# Patient Record
Sex: FEMALE | Race: WHITE
[De-identification: names, ages, dates, MRNs, and addresses within clinical notes are randomized per-mention and may not be internally consistent; named-entity substitution may affect disease eponyms.]

---

## 2018-10-22 ENCOUNTER — HOSPITAL ENCOUNTER (EMERGENCY)
Dept: HOSPITAL 25 - UCCORT | Age: 68
Discharge: HOME | End: 2018-10-22
Payer: MEDICARE

## 2018-10-22 VITALS — SYSTOLIC BLOOD PRESSURE: 129 MMHG | DIASTOLIC BLOOD PRESSURE: 73 MMHG

## 2018-10-22 DIAGNOSIS — I10: ICD-10-CM

## 2018-10-22 DIAGNOSIS — Z96.643: ICD-10-CM

## 2018-10-22 DIAGNOSIS — Z87.81: ICD-10-CM

## 2018-10-22 DIAGNOSIS — L23.1: ICD-10-CM

## 2018-10-22 DIAGNOSIS — Z79.1: ICD-10-CM

## 2018-10-22 DIAGNOSIS — M25.521: Primary | ICD-10-CM

## 2018-10-22 PROCEDURE — G0463 HOSPITAL OUTPT CLINIC VISIT: HCPCS

## 2018-10-22 PROCEDURE — 99202 OFFICE O/P NEW SF 15 MIN: CPT

## 2018-10-22 NOTE — RAD
Indication: Right arm injury.



4 views of the right elbow demonstrates degenerative changes with osteophyte formation of

the coronoid process as well as the radius. The lateral epicondyles demonstrate likely old

injury. No acute injury is noted.



IMPRESSION: Multiple areas of degenerative changes with osteophyte formation. Likely old

fracture of the lateral epicondyles.

## 2018-10-22 NOTE — UC
Upper Extremity HPI





- HPI Summary


HPI Summary: 


68 year old female presents with right elbow pain. States yesterday she slipped 

on wet grass, fell backward, and struck right elbow on metal bumper of horse 

trailer. Pain is constant ache over lateral aspect of elbow. Non-radiating. 

Worsens with movement. Improves with meloxicam. Denies numbness or tingling.








- History of Current Complaint


Chief Complaint: UCUpperExtremity


Stated Complaint: RIGHT ARM INJURY


Time Seen by Provider: 10/22/18 10:18


Hx Obtained From: Patient


Onset/Duration: Sudden Onset


Severity Currently: Moderate


Pain Intensity: 9


Location Of Pain: Is Discrete @ - right lateral elbow


Character: Aching


Aggravating Factor(s): Movement, Flexion, Extension


Alleviating Factor(s): OTC Meds, Rest


Associated Signs And Symptoms: Negative: Swelling, Bruising, Weakness, Numbness/

Tingling





- Allergies/Home Medications


Allergies/Adverse Reactions: 


 Allergies











Allergy/AdvReac Type Severity Reaction Status Date / Time


 


Adhesive Tape Allergy  Rash Verified 10/22/18 09:55











Home Medications: 


 Home Medications





Meloxicam [Mobic] 1 tab DAILY 10/22/18 [History Confirmed 10/22/18]











PMH/Surg Hx/FS Hx/Imm Hx


Cardiovascular History: Hypertension


Other History Of: 


   Negative For: Anticoagulant Therapy





- Surgical History


Surgical History: Yes


Surgery Procedure, Year, and Place: BILATERAL HIP REPLACEMENT/BUNION REMOVAL/

TUBIAL LIGATION





- Family History


Family History: Noncontributory





- Social History


Occupation: Retired


Lives: With Family


Alcohol Use: Rare


Substance Use Type: None


Smoking Status (MU): Never Smoked Tobacco





Review of Systems


Constitutional: Negative


Skin: Negative


Neurovascular: Negative


Musculoskeletal: Arthralgia - right elbow, Decreased ROM - right elbow


Is Patient Immunocompromised?: No


All Other Systems Reviewed And Are Negative: Yes





Physical Exam


Triage Information Reviewed: Yes


Appearance: Well-Appearing, Well-Nourished, Pain Distress - Mildly 

uncomfortable. Right arm supported with pillow.


Vital Signs: 


 Initial Vital Signs











Temp  98 F   10/22/18 09:57


 


Pulse  73   10/22/18 09:57


 


Resp  16   10/22/18 09:57


 


BP  129/73   10/22/18 09:57


 


Pulse Ox  97   10/22/18 09:57











Neck: Positive: Supple, Nontender


Respiratory: Positive: No respiratory distress


Cardiovascular: Positive: Pulses Normal, Brisk Capillary Refill


Musculoskeletal: Positive: Strength Intact -  strengths equal bilaterally, 

ROM Limited @ - Flexion and extension right elbow d/t pain, Edema @ - Mild 

edema left lateral elbow, Other: - Tenderness with palpation over lateral 

epicondyle. No crepitus or gross deformity noted.





Diagnostics





- Radiology


  ** No standard instances


Xray Interpretation: Positive (See Comments)


Radiology Interpretation Completed By: ED Physician - Mildly displaced fracture 

lateral epicondyle right humerus - question of old fracture, Radiologist - 

Patient Name: MARIAJOSE ADKINS Medical Record#: [id number] Ordering Physician

: Ke Nesbitt NP Acct.#: [id number] : 1950 Age: 68 Sex: F 

Location: URGENT [facility] - [place] Exam Date: 10/22/18 1025 ADM Status: REG ER  

Order Information: ELBOW RIGHT 3+ VWS Accession Number: [id number] CPT: 57598 

Indication: Right arm injury.  4 views of the right elbow demonstrates 

degenerative changes with osteophyte formation of the coronoid process as well 

as the radius. The lateral epicondyles demonstrate likely old injury. No acute 

injury is noted.  IMPRESSION: Multiple areas of degenerative changes with 

osteophyte formation. Likely old fracture of the lateral epicondyles.





Upper Extremity Course/Dx





- Course


Course Of Treatment: 68-year-old female presents with right elbow pain after 

slipping and falling yesterday and striking her elbow on the metal bumper of a 

horse trailer.  On exam she was tender over the lateral aspect of the elbow.  X-

ray shows a fracture of the lateral condyle was a question as to whether this 

may be an old fracture.  With the pain will treat as an acute fracture.  

Patient was placed in a sling.  Recommend RICE, over-the-counter analgesics, 

and range of motion exercises.  She was given a referral for follow-up with 

orthopedic surgery.  Warning symptoms reviewed.  Verbalizes understanding and 

agrees with plan of care.





- Differential Dx/Diagnosis


Differential Diagnosis/HQI/PQRI: Bursitis, Contusion, Fracture (Closed)


Provider Diagnoses: right elbow pain





Discharge





- Sign-Out/Discharge


Documenting (check all that apply): Patient Departure


All imaging exams completed and their final reports reviewed: Yes





- Discharge Plan


Condition: Stable


Disposition: HOME


Patient Education Materials:  Elbow Fracture (ED)


Referrals: 


Nona Zelaya NP [Primary Care Provider] - 


Roberto Kiran MD [Medical Doctor] - 5 Days (If no improvement. Call for 

appointment.)


Additional Instructions: 


The x-ray does performed in the clinic today showed a fracture however it 

appears to be old.  Because you are tender over that area I am going to treat 

it as an acute fracture.





Wear this ling that was provided to you for the next 2 days.  You may remove to 

shower and to sleep.  Be sure to take your arm out of the sling every couple of 

hours and do some gentle range of motion exercises.  After 2 days you do not 

need to use the sling but you should continue the range of motion exercises.





Rest the arm as much as possible.





Apply ice for 15-20 minutes over the affected area at least 4 times a day.





Be sure to keep her arm elevated at the level of heart to help reduce any 

swelling.





You may continue to use your meloxicam. You should not take any other anti-

inflammatories such as ibuprofen (Advil, Motrin) or naproxen (Aleve) as these 

are very similar to the meloxicam.  You may take acetaminophen (Tylenol) 

according to directions as needed for additional pain relief.





I have given you a referral to Dr. Kiran, orthopedic surgery, for follow-up if 

your symptoms persist.





Seek immediate medical attention if you have worsening pain that is not 

relieved with pain medications, have increased swelling, you develop numbness, 

tingling, or weakness of the arm, hands, or fingers, lose function of the arm.





- Billing Disposition and Condition


Condition: STABLE


Disposition: Home

## 2019-10-15 NOTE — HP
HISTORY AND PHYSICAL:

 

DATE OF ADMISSION/SURGERY:  10/24/19

 

DATE OF OFFICE VISIT:  10/14/19

 

SURGEON:  Rosalina Mejia MD.* (DICTATED BY CHERI NEW)

 

PROCEDURE:  Right total knee arthroplasty.

 

CHIEF COMPLAINT:  Right knee pain.

 

HISTORY OF PRESENT ILLNESS:  Ms. Snow is a 69-year-old female with 
continued complaints of right knee pain secondary to end-stage osteoarthritis.  
She has failed conservative treatment and elected to proceed with a right total 
knee arthroplasty.

 

PAST MEDICAL HISTORY:  Hypertension, high cholesterol, mitral valve 
regurgitation, GERD, basal cell carcinoma, and history of Lyme disease.

 

PAST SURGICAL HISTORY:  Bunionectomy and bilateral total hip arthroplasties.

 

CURRENT MEDICATIONS:

1.  Amlodipine 5 mg a day.

2.  Losartan potassium 50 mg a day.

3.  Meloxicam 15 mg daily.

4.  Ranitidine 150 mg twice a day.

5.  Magnesium 300 mg a day.

6.  Vitamin C.

7.  Vitamin D.

8.  Vitamin E.

9.  Probiotic.

10.  Metamucil as needed.

 

ALLERGIES:  No known drug allergies.

 

FAMILY HISTORY:  Coronary artery disease.

 

SOCIAL HISTORY:  She is a 69-year-old female, lives with her .  She does 
not smoke, use drugs or alcohol.

 

REVIEW OF SYSTEMS:  A complete 14-point review of systems was reviewed with the 
patient.  It was positive for GERD.  She denies history of DVT, PE, hepatitis, 
HIV, or anesthesia problems.

 

                               PHYSICAL EXAMINATION

 

GENERAL:  She is well developed, well nourished, in no acute distress.

 

VITAL SIGNS:  She stands 67 inches tall, weighs 248 pounds.  Blood pressure is 
140/82, heart rate is 76.

 

HEENT:  Normocephalic, atraumatic.

 

NECK:  Supple.  No palpable lymph nodes.

 

PULMONARY:  The lungs are clear to auscultation bilaterally.

 

CARDIO:  Regular rate and rhythm.  Strong S1, S2.

 

ABDOMEN:  Soft, nontender, nondistended.

 

NEUROLOGICAL:  She is alert and oriented x3.

 

MUSCULOSKELETAL:  Right lower extremity:  The skin is intact.  There are no 
open wounds or abrasions.  There is a moderate effusion of the right knee 
joint.  Range of motion is 10 to 100 degrees of flexion.  She has a 2+ dorsalis 
pedis pulse and intact sensation.

 

 ASSESSMENT AND PLAN:  Ms. Snow is a 69-year-old female with end-stage 
osteoarthritis of the right knee.  She has failed conservative treatment and 
elected to proceed with a right total knee arthroplasty.  The surgery is 
scheduled for 10/24/19 with Dr. Mejia.  Dr. Mejia discussed the risks and 
benefits of the surgery at today's visit and all of her questions were 
answered.  She will follow up with Dr. Mejia 2 weeks after the surgery.

 

 ____________________________________ CHERI NEW

 

[id number]/CPS #: [id number]

[email or web address]

## 2019-10-24 ENCOUNTER — HOSPITAL ENCOUNTER (INPATIENT)
Dept: HOSPITAL 25 - AA | Age: 69
LOS: 1 days | Discharge: HOME HEALTH | DRG: 470 | End: 2019-10-25
Attending: ORTHOPAEDIC SURGERY | Admitting: ORTHOPAEDIC SURGERY
Payer: MEDICARE

## 2019-10-24 DIAGNOSIS — I34.0: ICD-10-CM

## 2019-10-24 DIAGNOSIS — E78.00: ICD-10-CM

## 2019-10-24 DIAGNOSIS — M25.461: ICD-10-CM

## 2019-10-24 DIAGNOSIS — I83.90: ICD-10-CM

## 2019-10-24 DIAGNOSIS — K44.9: ICD-10-CM

## 2019-10-24 DIAGNOSIS — L25.9: ICD-10-CM

## 2019-10-24 DIAGNOSIS — Z85.828: ICD-10-CM

## 2019-10-24 DIAGNOSIS — M17.0: Primary | ICD-10-CM

## 2019-10-24 DIAGNOSIS — L57.0: ICD-10-CM

## 2019-10-24 DIAGNOSIS — M25.761: ICD-10-CM

## 2019-10-24 DIAGNOSIS — K21.9: ICD-10-CM

## 2019-10-24 DIAGNOSIS — E78.2: ICD-10-CM

## 2019-10-24 DIAGNOSIS — Z88.8: ICD-10-CM

## 2019-10-24 DIAGNOSIS — Z96.643: ICD-10-CM

## 2019-10-24 DIAGNOSIS — E66.9: ICD-10-CM

## 2019-10-24 DIAGNOSIS — I10: ICD-10-CM

## 2019-10-24 DIAGNOSIS — I42.9: ICD-10-CM

## 2019-10-24 PROCEDURE — 80048 BASIC METABOLIC PNL TOTAL CA: CPT

## 2019-10-24 PROCEDURE — 36415 COLL VENOUS BLD VENIPUNCTURE: CPT

## 2019-10-24 PROCEDURE — 88311 DECALCIFY TISSUE: CPT

## 2019-10-24 PROCEDURE — 85049 AUTOMATED PLATELET COUNT: CPT

## 2019-10-24 PROCEDURE — 0SRC0J9 REPLACEMENT OF RIGHT KNEE JOINT WITH SYNTHETIC SUBSTITUTE, CEMENTED, OPEN APPROACH: ICD-10-PCS | Performed by: ORTHOPAEDIC SURGERY

## 2019-10-24 PROCEDURE — C1776 JOINT DEVICE (IMPLANTABLE): HCPCS

## 2019-10-24 PROCEDURE — 88305 TISSUE EXAM BY PATHOLOGIST: CPT

## 2019-10-24 PROCEDURE — 85018 HEMOGLOBIN: CPT

## 2019-10-24 PROCEDURE — 85014 HEMATOCRIT: CPT

## 2019-10-24 RX ADMIN — FAMOTIDINE SCH MG: 20 TABLET, FILM COATED ORAL at 21:31

## 2019-10-24 RX ADMIN — ACETAMINOPHEN SCH MG: 325 TABLET ORAL at 21:32

## 2019-10-24 RX ADMIN — FENTANYL CITRATE PRN MCG: 0.05 INJECTION, SOLUTION INTRAMUSCULAR; INTRAVENOUS at 18:18

## 2019-10-24 RX ADMIN — MAGNESIUM HYDROXIDE SCH ML: 400 SUSPENSION ORAL at 21:31

## 2019-10-24 RX ADMIN — CEFAZOLIN SCH MLS/HR: 1 INJECTION, POWDER, FOR SOLUTION INTRAVENOUS at 23:48

## 2019-10-24 RX ADMIN — SODIUM CHLORIDE, SODIUM LACTATE, POTASSIUM CHLORIDE, AND CALCIUM CHLORIDE SCH MLS/HR: 600; 310; 30; 20 INJECTION, SOLUTION INTRAVENOUS at 20:12

## 2019-10-24 RX ADMIN — FENTANYL CITRATE PRN MCG: 0.05 INJECTION, SOLUTION INTRAMUSCULAR; INTRAVENOUS at 18:41

## 2019-10-24 RX ADMIN — DOCUSATE SODIUM SCH MG: 100 CAPSULE, LIQUID FILLED ORAL at 21:32

## 2019-10-24 NOTE — XMS REPORT
Continuity of Care Document (CCD)

 Created on:2019



Patient:Cindy Snow

Sex:Female

:1950

External Reference #:[id number]





Demographics







 Address  [address] [place] [address]

 

 Home Phone  [phone]

 

 Mobile Phone  [phone]

 

 Preferred Language  en

 

 Marital Status  Declined to Specify/Unknown

 

 [employer] Affiliation  Unknown

 

 Race  White

 

 Ethnic Group  Declined to Specify/Unknown









Author







 Name  Cyndee Theodore MD, PHD

 

 Address  [address] [place] [address],  [address] [place] [id number]









Care Team Providers







 Name  Role  Phone

 

 Cyndee Theodore MD, PHD - Family  Care Team Information   [phone]
[phone]



 Medicine    









Problems







 Active Problems  Provider  Date

 

 Localized, primary osteoarthritis of the  Olegario Ribera MD  Onset: 2012



 pelvic region and thigh    

 

 Prosthetic arthroplasty of the hip  Jackie Bernard MD  Onset: 2012

 

 Chest pain  Ayala Fischer ANP  Onset: 10/14/2015

 

 Mixed hyperlipidemia  Ayala Fischer ANP  Onset: 10/14/2015

 

 Essential hypertension  Ayala Fischer ANP  Onset: 10/14/2015

 

 Contact dermatitis  Cyndee Theodore MD, PHD  Onset: 2019

 

 Actinic keratosis  Cyndee Theodore MD, PHD  Onset: 2019

 

 Insomnia  Cnydee Theodore MD, PHD  Onset: 2019

 

 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

 

 Localized, primary osteoarthritis  Cyndee Theodore MD, PHD  Onset: 2019

 

 Diaphragmatic hernia  Cyndee Theodore MD, PHD  Onset: 2019

 

 Cardiomyopathy, unspecified  Cyndee Theodore MD, PHD  Onset: 10/15/2019

 

 Mitral valve disorder  Cyndee Theodore MD, PHD  Onset: 10/15/2019

 

 Pain in limb  Cyndee Theodore MD, PHD  Onset: 04/15/2019

 

 Gynecologic examination  Cyndee Theodore MD, PHD  Onset: 04/15/2019







Social History







 Type  Date  Description  Comments

 

 Birth Sex    Unknown  

 

 Tobacco Use  Start: Unknown  Never Smoked Cigarettes  

 

 ETOH Use    Rarely consumes alcohol  

 

 Tobacco Use  Start: Unknown  Patient denies history of smoking  

 

 Smoking Status  Reviewed: 10/10/19  Patient denies history of smoking  







Allergies, Adverse Reactions, Alerts







 Active Allergies  Reaction  Severity  Comments  Date

 

 NKDA        2018

 

 Environmental        2013









 Inactive Allergies









 NKDA        2012

 

 Adhesives      Sores  10/03/2012







Medications







 Active Medications  SIG  Qnty  Indications  Ordering  Date



         Provider  

 

 D3 Maximum Strength  1 cap by mouth  90caps  M17.0  Cyndee Theodore,  2019



   every day      MD, PHD  



 5000Unit Capsules          



           

 

 Arnicare  apply to affected  75g  M17.0  Cyndee Theodore,  2019



           Gel  area four times a      MD, PHD  



   day pain, swelling,        



   bruising        

 

 Ranitidine HCL  take one tablet by  180tabs  K44.9  Cyndee Theodore,  2019



   mouth twice a day      MD, PHD  



 150mg Tablets  as needed        



           

 

 Nystatin  apply to affected  90gm  L30.9  Cyndee Theodore,  2019



   area twice a day      MD, PHD  



 [id number] Cream  until rash has        



   disappeared for a        



   few days        

 

 Losartan Potassium  1 by mouth every  90tabs  I10  Vivien Singh  2018



   day      DELIA Sanders,  



 50mg Tablets        FNP  



           

 

 Amlodipine Besylate  1 by mouth every  30tabs  I10  Vivien Singh  2018



   day      DELIA Sanders,  



 5mg Tablets        FNP  



           

 

 Herbal Supplements        Vivien Singh  03/08/2018



 X 8        DELIA Sanders,  



         FNP  

 

 Meloxicam  1 by mouth every  90tabs  M16.0  Sarah Eduardo,  



           15mg  day      FNP  



 Tablets          



           

 

 Magnesium  take 1 tab by mouth      Unknown  



           300mg  every daily        



 Capsules          



           

 

 Aspirin Adult  1 tab by mouth      Unknown  



               325mg  every day        



 Tablets          



           







Immunizations







 CPT Code  Status  Date  Vaccine  Lot #

 

 [id number]  Given  2019  Tdap injection  







Vital Signs







 Date  Vital  Result  Comment

 

 10/15/2019  2:00pm  BP Systolic  132 mmHg  









 BP Diastolic  80 mmHg  

 

 Body Temperature  97.4 F  

 

 Heart Rate  82 /min  

 

 Respiratory Rate  16 /min  

 

 Height  67 inches  5'7"

 

 Weight  247.00 lb  

 

 BMI (Body Mass Index)  38.7 kg/m2  

 

 BSA (Body Surface Area)  2.21 m2  

 

 Ideal body weight in kilograms  61 kg  

 

 O2 % BldC Oximetry  97 %  









 10/04/2019  7:49am  BP Systolic Sitting Left Arm  126 mmHg  









 BP Diastolic Sitting Left Arm  72 mmHg  

 

 Heart Rate  71 /min  

 

 Respiratory Rate  18 /min  

 

 Height  67 inches  5'7"

 

 Weight  248.00 lb  

 

 BMI (Body Mass Index)  38.8 kg/m2  

 

 BSA (Body Surface Area)  2.22 m2  

 

 Ideal body weight in kilograms  61 kg  

 

 O2 % BldC Oximetry  97 %  







Results







 Description

 

 No Information Available







Procedures







 Date  Code  Description  Status

 

 10/04/2019  24495  EKG-Tracing And Report  Completed







Medical Devices







 Description

 

 No Information Available







Encounters







 Type  Date  Location  Provider  Dx  Diagnosis

 

 Office Visit  10/15/2019  Family Medicine  Arben  Z01.810  Encounter for



   2:15p  [facility]  MD Cyndee,    preprocedural



       PHD    cardiovascular



           examination









 M17.0  Bilateral primary osteoarthritis of knee

 

 N95.1  Menopausal and female climacteric states

 

 I10  Essential (primary) hypertension

 

 I34.0  Nonrheumatic mitral (valve) insufficiency

 

 I42.9  Cardiomyopathy, unspecified









 Office Visit  10/04/2019  Cardiology  PAMELLA Baez01.810  Encounter for



   8:20a  Office  Kasia GUZMAN,    preprocedural



       PA    cardiovascular



           examination









 I42.9  Cardiomyopathy, unspecified

 

 I34.0  Nonrheumatic mitral (valve) insufficiency

 

 I10  Essential (primary) hypertension

 

 E78.2  Mixed hyperlipidemia









 Office Visit  2019 10:40a  Cardiology  Vivien Singh  R07.9  Chest pain,



     Office  DELIA Sanders,    unspecified



       FNP    









 I42.9  Cardiomyopathy, unspecified

 

 I34.0  Nonrheumatic mitral (valve) insufficiency

 

 I10  Essential (primary) hypertension

 

 E78.2  Mixed hyperlipidemia







Assessments







 Date  Code  Description  Provider

 

 10/15/2019  Z01.810  Encounter for preprocedural  Cyndee Theodore MD, PHD



     cardiovascular examination  

 

 10/15/2019  M17.0  Bilateral primary osteoarthritis of  Cyndee Theodore MD, 
PHD



     knee  

 

 10/15/2019  N95.1  Menopausal and female climacteric  Cyndee Theodore MD, PHD



     states  

 

 10/15/2019  I10  Essential (primary) hypertension  Cyndee Theodore MD, PHD

 

 10/15/2019  I34.0  Nonrheumatic mitral (valve)  Cyndee Theodore MD, PHD



     insufficiency  

 

 10/15/2019  I42.9  Cardiomyopathy, unspecified  Cyndee Theodore MD, PHD

 

 10/04/2019  Z01.810  Encounter for preprocedural  Kasia Baez, PA



     cardiovascular examination  

 

 10/04/2019  I42.9  Cardiomyopathy, unspecified  Kasia Baez, PA

 

 10/04/2019  I34.0  Nonrheumatic mitral (valve)  Kasia Baez, PA



     insufficiency  

 

 10/04/2019  I10  Essential (primary) hypertension  Kasia Baez, PA

 

 10/04/2019  E78.2  Mixed hyperlipidemia  Kasia Baez, PA

 

 2019  R07.9  Chest pain, unspecified  Vivien Singh, MSN,



       FNP

 

 2019  I42.9  Cardiomyopathy, unspecified  Vivien Singh, MSN,



       FNP

 

 2019  I34.0  Nonrheumatic mitral (valve)  Vivien Singh, DELIA,



     insufficiency  FNP

 

 2019  I10  Essential (primary) hypertension  Vivien Singh, MSN,



       FNP

 

 2019  E78.2  Mixed hyperlipidemia  Vivien Singh, MSN,



       FNP







Plan of Treatment

Future Appointment(s):2020 11:00 am - Vivien Singh, DELIA, FNP at 
[facility]16/15/2019 - Cyndee Theodore MD, PHDZ01.810 Encounter for 
preprocedural cardiovascular examinationNew Labs:Ua RFX Micro &amp; Culture II, 
Ordered: 10/15/19CBC W/Auto Diff &amp; PLT, Ordered: 10/15/19CMP &amp; CBC, 
Ordered: 10/15/19PT W/Inr, Ordered: 10/15/19Comments:LABS received   - done [place] [facility] - all normalRebecca is optimized for her knee surgery.Follow up:
need records release from Dr. Mejia for lab work drawn on 10/14/19 so we can 
clear her for qakfospL97.0 Bilateral primary osteoarthritis of kneeN95.1 
Menopausal and female climacteric [id number] Essential (primary) 
xhpjlcqdiwgxT16.0 Nonrheumatic mitral (valve) hryorvafnmavtJ27.9 Cardiomyopathy
, unspecified



Functional Status







 Functional Condition  Comment  Date  Status

 

 Independent with all ADL's      Active







Mental Status







 Description

 

 No Information Available







Referrals







 Description

 

 No Information Available

## 2019-10-24 NOTE — XMS REPORT
Continuity of Care Document (CCD)

 Created on:2019



Patient:Cindy Snow

Sex:Female

:1950

External Reference #:[id number]





Demographics







 Address  [address] [place] [address]

 

 Home Phone  [phone]

 

 Mobile Phone  [phone]

 

 Preferred Language  en

 

 Marital Status  Declined to Specify/Unknown

 

 Yarsanism Affiliation  Unknown

 

 Race  White

 

 Ethnic Group  Declined to Specify/Unknown









Author







 Name  Kasia Baez PA (transmitted by agent of provider Maura Cagle)

 

 Address  [address], [address] [place] [address]









Care Team Providers







 Name  Role  Phone

 

 Cyndee Theodore MD, PHD - Family  Care Team Information   [phone]
[phone]



 Medicine    









Problems







 Active Problems  Provider  Date

 

 Localized, primary osteoarthritis of the  BacilioOlegario MD  Onset: 2012



 pelvic region and thigh    

 

 Prosthetic arthroplasty of the hip  Jackie Bernard MD  Onset: 2012

 

 Chest pain  Ayala Fischer ANP  Onset: 10/14/2015

 

 Mixed hyperlipidemia  Ayala Fischer ANP  Onset: 10/14/2015

 

 Essential hypertension  Ayala Fischer ANP  Onset: 10/14/2015

 

 Contact dermatitis  Cyndee Theodore MD, PHD  Onset: 2019

 

 Actinic keratosis  Cyndee Theodore MD, PHD  Onset: 2019

 

 Insomnia  Cyndee Theodore MD, PHD  Onset: 2019

 

 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

 

 Localized, primary osteoarthritis  Cyndee Thedoore MD, PHD  Onset: 2019

 

 Diaphragmatic hernia  Cyndee Theodore MD, PHD  Onset: 2019

 

 Pain in limb  Cyndee Theodore MD, PHD  Onset: 04/15/2019

 

 Gynecologic examination  Cyndee Theodore MD, PHD  Onset: 04/15/2019







Social History







 Type  Date  Description  Comments

 

 Birth Sex    Unknown  

 

 Tobacco Use  Start: Unknown  Never Smoked Cigarettes  

 

 ETOH Use    Rarely consumes alcohol  

 

 Tobacco Use  Start: Unknown  Patient denies history of smoking  

 

 Smoking Status  Reviewed: 04/15/19  Patient denies history of smoking  







Allergies, Adverse Reactions, Alerts







 Active Allergies  Reaction  Severity  Comments  Date

 

 NKDA        2018

 

 Environmental        2013









 Inactive Allergies









 NKDA        2012

 

 Adhesives      Sores  10/03/2012







Medications







 Active Medications  SIG  Qnty  Indications  Ordering  Date



         Provider  

 

 D3 Maximum Strength  1 cap by mouth  90caps  M17.0  Cnydee Theodore,  2019



   every day      MD, PHD  



 5000Unit Capsules          



           

 

 Arnicare  apply to affected  75g  M17.0  Cyndee Theodore,  2019



           Gel  area four times a      MD, PHD  



   day pain, swelling,        



   bruising        

 

 Ranitidine HCL  take one tablet by  180tabs  K44.9  Cyndee Theodore,  2019



   mouth twice a day      MD, PHD  



 150mg Tablets  as needed        



           

 

 Nystatin  apply to affected  90gm  L30.9  Cyndee Theodore,  2019



   area twice a day      MD, PHD  



 296456Bfgd/GM Cream  until rash has        



   disappeared for a        



   few days        

 

 Losartan Potassium  1 by mouth every  90tabs  I10  Vivien Singh  2018



   day      DELIA Sanders,  



 50mg Tablets        FNP  



           

 

 Amlodipine Besylate  1 by mouth every  30tabs  I10  Vivien Singh  2018



   day      DELIA Sanders,  



 5mg Tablets        FNP  



           

 

 Herbal Supplements        Vivien Singh  03/08/2018



 X 8        DELIA Sanders,  



         FNP  

 

 Meloxicam  1 by mouth every      Unknown  



           15mg  day        



 Tablets          



           

 

 Magnesium  take 1 tab by mouth      Unknown  



           300mg  every daily        



 Capsules          



           

 

 Aspirin Adult  1 tab by mouth      Unknown  



               325mg  every day        



 Tablets          



           









 History Medications









 Gabapentin  1-2 tab by mouth  60caps  N95.1  Cyndee Theodore,  04/15/2019 -



           300mg  at bedtime      MD, PHD  Unknown



 Capsules          



           







Immunizations







 CPT Code  Status  Date  Vaccine  Lot #

 

 [id number]  Given  2019  Tdap injection  







Vital Signs







 Date  Vital  Result  Comment

 

 10/04/2019  7:49am  BP Systolic Sitting Left Arm  126 mmHg  









 BP Diastolic Sitting Left Arm  72 mmHg  

 

 Heart Rate  71 /min  

 

 Respiratory Rate  18 /min  

 

 Height  67 inches  5'7"

 

 Weight  248.00 lb  

 

 BMI (Body Mass Index)  38.8 kg/m2  

 

 BSA (Body Surface Area)  2.22 m2  

 

 Ideal body weight in kilograms  61 kg  

 

 O2 % BldC Oximetry  97 %  









 2019 10:41am  BP Systolic Sitting Left Arm  138 mmHg  









 BP Diastolic Sitting Left Arm  90 mmHg  

 

 Heart Rate  76 /min  

 

 Respiratory Rate  18 /min  

 

 Height  67 inches  5'7"

 

 Weight  249.00 lb  

 

 BMI (Body Mass Index)  39.0 kg/m2  

 

 BSA (Body Surface Area)  2.22 m2  

 

 Ideal body weight in kilograms  61 kg  

 

 O2 Saturation Level with Exercise  93 %  







Results







 Test  Date  Facility  Test  Result  H/L  Range  Note

 

 Genital Culture  04/15/2019  [facility]  Gram Stain  GRAM STAIN      1, 2



 W/ Gram Stain    [address]    INDIC <SEE      



     [place] [address]    NOTE>      



     [phone]          









 Gram Stain  FEW GR POS. BACI <SEE NOTE>      3

 

 Gram Stain  RARE GRAM POSITI <SEE NOTE>      4

 

 Gram Stain  FEW WHITE BLOOD  <SEE NOTE>      5

 

 Genital Culture  GENITAL TELAM      









 Laboratory test  04/15/2019  [facility]  Uric Acid  4.2 mg/dL  Normal  2.6-6.0  



 finding    [address]          



     [place] [address]          



     [phone]          









 Anti-Nuclear Antibodies Direct  Negative AU/mL    Negative  6

 

 Rheumatoid Factor Screen  < 10.0 IU/mL  Normal  0.0-15.0  









 CCP Igg/Iga  04/15/2019  [facility]  CCP Igg/Iga  5 units    0-19  7



 Antibodies    [address]  Antibodies        



     [place] [address]          



     [phone]          

 

 HPV High Risk  04/15/2019  [facility]  HPV, high-risk  NEGATIVE    Negative  



     [address]          



     [place] [address]          



     [phone]          









 HPV Source:  THIN PREP      









 1  Z01.419,M79.674

 

 2  GRAM STAIN INDICATES NORMAL GENITAL TELMA

 

 3  FEW GR POS. BACILLI SUGGESTIVE OF LACTOBACILLUS SP.

 

 4  RARE GRAM POSITIVE COCCI

 

 5  FEW WHITE BLOOD CELLS

 

 6  Performed at:   - [facility]  [id number]



   : Gilbert Diaz MD, Phone:  [phone]



   Performed at:   - [facility] [address]  [id number]



   : Araceli B Reyes MD, Phone:  [phone]

 

 7  Negative               <20



   Weak positive      20 - 39



   Moderate positive  40 - 59



   Strong positive        >59







Procedures







 Date  Code  Description  Status

 

 10/04/2019  72222  EKG-Tracing And Report  Completed







[employer]







 Description

 

 No Information Available







Encounters







 Type  Date  Location  Provider  Dx  Diagnosis

 

 Office Visit  10/04/2019  Cardiology Office  Kasia Baez  Z01.810  
Encounter for



   8:20a    CHERI GUZMAN    preprocedural



           cardiovascular



           examination









 I42.9  Cardiomyopathy, unspecified

 

 I34.0  Nonrheumatic mitral (valve) insufficiency

 

 I10  Essential (primary) hypertension

 

 E78.2  Mixed hyperlipidemia









 Office Visit  2019 10:40a  Cardiology  Vivien Singh  R07.9  Chest pain,



     Office  DELIA Sanders,    unspecified



       FNP    









 I42.9  Cardiomyopathy, unspecified

 

 I34.0  Nonrheumatic mitral (valve) insufficiency

 

 I10  Essential (primary) hypertension

 

 E78.2  Mixed hyperlipidemia









 Office Visit  04/15/2019 11:30a  Family Medicine  Arben,  Z01.419  Encntr for 
gyn



     [facility]  MD Cyndee,    exam (general)



       PHD    (routine) w/o



           abn findings









 Z01.419  Encntr for gyn exam (general) (routine) w/o abn findings

 

 N95.1  Menopausal and female climacteric states

 

 N95.1  Menopausal and female climacteric states

 

 M79.674  Pain in right toe(s)

 

 I10  Essential (primary) hypertension

 

 M79.674  Pain in right toe(s)







Assessments







 Date  Code  Description  Provider

 

 10/04/2019  Z01.810  Encounter for preprocedural  Kasia Baez, PA



     cardiovascular examination  

 

 10/04/2019  I42.9  Cardiomyopathy, unspecified  Kasia Baez, PA

 

 10/04/2019  I34.0  Nonrheumatic mitral (valve)  Kasia Baez, PA



     insufficiency  

 

 10/04/2019  I10  Essential (primary) hypertension  Kasia Baez, PA

 

 10/04/2019  E78.2  Mixed hyperlipidemia  Kasia Baez, PA

 

 2019  R07.9  Chest pain, unspecified  Vivien Singh, MSN,



       FNP

 

 2019  I42.9  Cardiomyopathy, unspecified  Vivien Singh, MSN,



       FNP

 

 2019  I34.0  Nonrheumatic mitral (valve)  SinghVivien beltran, MSN,



     insufficiency  FNP

 

 2019  I10  Essential (primary) hypertension  Vivien Singh, MSN,



       FNP

 

 2019  E78.2  Mixed hyperlipidemia  Vivien Singh, MSN,



       FNP

 

 04/15/2019  Z01.419  Encounter for gynecological  Cyndee Theodore MD, PHD



     examination (general) (routine)  



     without abnormal findings  

 

 04/15/2019  Z01.419  Encounter for gynecological  Cyndee Theodore MD, PHD



     examination (general) (routine)  

 

 04/15/2019  N95.1  Menopausal and female climacteric  Cyndee Theodore MD, PHD



     states  

 

 04/15/2019  N95.1  Menopausal and female climacteric  Cyndee Theodore MD, PHD



     states  

 

 04/15/2019  M79.674  Pain in right toe(s)  Cyndee Theodore MD, PHD

 

 04/15/2019  I10  Essential (primary) hypertension  Cyndee Theodore MD, PHD

 

 04/15/2019  M79.674  Pain in right toe(s)  Cyndee Theodore MD, PHD







Plan of Treatment

Future Appointment(s):10/15/2019  2:15 pm - Cyndee Theodore MD, PHD at [facility]2020 11:00 am - Vivien Singh, MSN, FNP at 
Cardiology Xebryn97/2019 - Kasia Baez, PAZ01.810 Encounter for 
preprocedural cardiovascular examinationComments:As above.I42.9 Cardiomyopathy, 
unspecifiedComments:Monitor.   No changes.I34.0 Nonrheumatic mitral (valve) 
insufficiencyNew Orders:Echocardiogram, Ordered: 10/04/19Comments:We will 
repeat the echo in 1 year.I10 Essential (primary) hypertensionComments:Monitor.
   BP goal is &lt;140/90 mmHg.E78.2 Mixed hyperlipidemiaNew Labs:Liver Function 
Tests, Ordered: 10/04/19LDL Cholesterol Profile, Ordered: 10/04/19Comments:Due 
for a lipid monitor.AllFollow up:1 year with echo prior



Functional Status







 Functional Condition  Comment  Date  Status

 

 Independent with all ADL's      Active







Mental Status







 Description

 

 No Information Available







Referrals







 Description

 

 No Information Available

## 2019-10-24 NOTE — XMS REPORT
Continuity of Care Document (CCD)

 Created on:2019



Patient:Cindy Snow

Sex:Female

:1950

External Reference #:[id number]





Demographics







 Address  [address] [place] [address]

 

 Home Phone  [phone]

 

 Email Address  [email or web address]

 

 Preferred Language  en

 

 Marital Status  Not  or 

 

 Advent Affiliation  Unknown

 

 Race  White

 

 Ethnic Group  Not  or 









Author







 Name  Rosalina Mejia M.D. (transmitted by agent of provider Eze Chavis)

 

 Address  [address] [place] [address]



   [place] [address]









Care Team Providers







 Name  Role  Phone

 

 Cyndee Theodore M.D. - Family  Care Team Information   [phone]
[phone]



 Medicine    









Problems







 Active Problems  Provider  Date

 

 Localized, primary osteoarthritis  Rosalina Mejia M.D.  Onset: 10/02/2019







Social History







 Type  Date  Description  Comments

 

 Birth Sex    Unknown  

 

 Tobacco Use  Start: Unknown  Patient has never smoked  

 

 Smoking Status  Reviewed: 10/02/19  Patient has never smoked  







Allergies, Adverse Reactions, Alerts







 Description

 

 No Known Drug Allergies







Medications







 Active Medications  SIG  Qnty  Indications  Ordering Provider  Date

 

 Amlodipine Besylate  Take One Tablet      Unknown  



                  5mg  By Mouth Every        



 Tablets  Day        



           

 

 Losartan Potassium  Take One Tablet      Unknown  



                 50mg  By Mouth Every        



 Tablets  Day        



           

 

 Meloxicam  Take One Tablet      Unknown  



        15mg Tablets  By Mouth Every        



   Day  Maximum        



   Daily Dose   1        

 

 Ranitidine HCL  Take One Tablet      Unknown  



             150mg  By Mouth Twice A        



 Tablets  Day as Needed        



           

 

 Magnesium  take one      Unknown  



        300mg Capsules  capsule/tablet        



   daily by mouth        

 

 Vitamin C  1 by mouth every      Unknown  



        1000mg Tablets  day        



           

 

 Vitamin D        Unknown  



 (Ergocalciferol)          

 

 Food Enzyme        Unknown  

 

 Vitamin E        Unknown  

 

 Probiotic        Unknown  







Immunizations







 Description

 

 No Information Available







Vital Signs







 Date  Vital  Result  Comment

 

 10/02/2019 10:15am  Height  67 inches  5'7"









 Weight  248.00 lb  

 

 Heart Rate  72 /min  

 

 BP Systolic  138 mmHg  

 

 BP Diastolic  84 mmHg  

 

 Body Temperature  97.5 F  

 

 Pain Level  5  

 

 BMI (Body Mass Index)  38.8 kg/m2  







Results







 Description

 

 No Information Available







Procedures







 Description

 

 No Information Available







Medical Devices







 Description

 

 No Information Available







Encounters







 Description

 

 No Information Available







Assessments







 Date  Code  Description  Provider

 

 10/02/2019  M25.561  Pain in right knee  Rosalina Mejia M.D.

 

 10/02/2019  M25.562  Pain in left knee  Rosalina Mejia M.D.

 

 10/02/2019  M25.461  Effusion, right knee  Rosalina Mejia M.D.

 

 10/02/2019  M25.462  Effusion, left knee  Rosalina Mejia M.D.

 

 10/02/2019  M17.0  Bilateral primary osteoarthritis of knee  Rosalina Mejia M.D.







Plan of Treatment

Future Appointment(s):10/24/2019  4:00 pm - Isak Diaz PA-C at [facility] at Reemuy43/  4:00 pm - CHERI Stone at [facility] at Chwjbf77/  4:00 pm - Rosalina Mejia M.D. at [facility] at Yfhtbm64/ 10:00 am - Rosalina Mejia M.D. at [facility] at Mzdtuy75/2019 - Rosalina Mejia M.D.M25.561 Pain in right 
kneeM25.562 Pain in left kneeFollow up:Follow up:   7-10 days before 
sxrzzqkR84.461 Effusion, right kneeM25.462 Effusion, left kneeM17.0 Bilateral 
primary osteoarthritis of knee



Functional Status







 Description

 

 No Information Available







Mental Status







 Description

 

 No Information Available







Referrals







 Description

 

 No Information Available

## 2019-10-24 NOTE — XMS REPORT
Continuity of Care Document (CCD)

 Created on:2019



Patient:Cindy Snow

Sex:Female

:1950

External Reference #:[id number]





Demographics







 Address  [address] [place] [address]

 

 Home Phone  [phone]

 

 Email Address  [email or web address]

 

 Preferred Language  en

 

 Marital Status  Not  or 

 

 Congregational Affiliation  Unknown

 

 Race  White

 

 Ethnic Group  Not  or 









Author







 Name  Rosalina Mejia M.D. (transmitted by agent of provider Eze Chavis)

 

 Address  [address] [place] [address]



   [place]



   [place] [address]









Care Team Providers







 Name  Role  Phone

 

 Cyndee Theodore M.D. - Family  Care Team Information   [phone]
[phone]



 Medicine    









Problems







 Active Problems  Provider  Date

 

 Localized, primary osteoarthritis  Rosalina Mejia M.D.  Onset: 10/02/2019







Social History







 Type  Date  Description  Comments

 

 Birth Sex    Unknown  

 

 Tobacco Use  Start: Unknown  Patient has never smoked  

 

 Smoking Status  Reviewed: 10/14/19  Patient has never smoked  







Allergies, Adverse Reactions, Alerts







 Description

 

 No Known Drug Allergies







Medications







 Active Medications  SIG  Qnty  Indications  Ordering Provider  Date

 

 Amoxicillin  take 4 pills, 2 g  4caps  M25.561  Rosalina Mejia,  10/14/2019



           500mg  1 hour before      M.D.  



 Capsules  dental or gi        



   procedure        

 

 Amlodipine Besylate  Take One Tablet By      Unknown  



                   5mg  Mouth Every Day        



 Tablets          



           

 

 Losartan Potassium  Take One Tablet By      Unknown  



                  50mg  Mouth Every Day        



 Tablets          



           

 

 Meloxicam  Take One Tablet By      Unknown  



         15mg Tablets  Mouth Every Day        



   Maximum Daily Dose        



     1        

 

 Ranitidine HCL  Take One Tablet By      Unknown  



              150mg  Mouth Twice A Day        



 Tablets  as Needed        



           

 

 Magnesium  take one      Unknown  



         300mg  capsule/tablet        



 Capsules  daily by mouth        



           

 

 Vitamin C  1 by mouth every      Unknown  



         1000mg  day        



 Tablets          



           

 

 Vitamin D        Unknown  



 (Ergocalciferol)          

 

 Food Enzyme        Unknown  

 

 Vitamin E        Unknown  

 

 Probiotic        Unknown  

 

 Metamucil Fiber        Unknown  



               51.7%          



 Packet          



           







Immunizations







 Description

 

 No Information Available







Vital Signs







 Date  Vital  Result  Comment

 

 10/14/2019 10:20am  Height  67 inches  5'7"









 Weight  248.50 lb  

 

 Heart Rate  76 /min  

 

 BP Systolic  140 mmHg  

 

 BP Diastolic  82 mmHg  

 

 Respiratory Rate  12 /min  

 

 Pain Level  5  

 

 BMI (Body Mass Index)  38.9 kg/m2  









 10/02/2019 10:15am  Height  67 inches  5'7"









 Weight  248.00 lb  

 

 Heart Rate  72 /min  

 

 BP Systolic  138 mmHg  

 

 BP Diastolic  84 mmHg  

 

 Body Temperature  97.5 F  

 

 Pain Level  5  

 

 BMI (Body Mass Index)  38.8 kg/m2  







Results







 Description

 

 No Information Available







Procedures







 Description

 

 No Information Available







Medical Devices







 Description

 

 No Information Available







Encounters







 Description

 

 No Information Available







Assessments







 Date  Code  Description  Provider

 

 10/14/2019  M25.561  Pain in right knee  Rosalina Mejia M.D.

 

 10/14/2019  M25.461  Effusion, right knee  Rosalina Mejia M.D.

 

 10/14/2019  M17.0  Bilateral primary osteoarthritis of knee  Rosalina Mejia M.D.

 

 10/02/2019  M25.561  Pain in right knee  Rosalina Mejia M.D.

 

 10/02/2019  M25.562  Pain in left knee  Rosalina Mejia M.D.

 

 10/02/2019  M25.461  Effusion, right knee  Rosalina Mejia M.D.

 

 10/02/2019  M25.462  Effusion, left knee  Rosalina Mejia M.D.

 

 10/02/2019  M17.0  Bilateral primary osteoarthritis of knee  Rosalina Mejia M.D.







Plan of Treatment

Future Appointment(s):2019 10:15 am - Rosalina Mejia M.D. at [facility] at Luxnik83/  4:00 pm - Isak Diaz PA-C at [facility] at Zklgxb78/  4:00 pm - CHERI Stone at [facility] at Aaetbv45/  4:00 pm - Rosalina Mejia M.D. at [facility] at Whmkvg69/ - Rosalina Mejia M.D.M25.561 Pain in right 
kneeNew Medication:Amoxicillin 500 mg - take 4 pills, 2 g 1 hour before dental 
or gi procedureNew Therapy:Physical TherapyFollow up:Follow up:   2 weeks after 
fcgchogX62.461 Effusion, right kneeM17.0 Bilateral primary osteoarthritis of 
knee



Functional Status







 Description

 

 No Information Available







Mental Status







 Description

 

 No Information Available







Referrals







 Description

 

 No Information Available

## 2019-10-24 NOTE — CONS
CONSULTATION REPORT:

 

DATE OF CONSULT:  10/24/19

 

PROVIDER:  CHERI Ennis.

 

REQUESTING PHYSICIAN:  Dr. Rosalina Mejia.*

 

REASON FOR CONSULT:  Co-management of chronic medical conditions.

 

HISTORY OF PRESENT ILLNESS/HOSPITAL COURSE:  Cindy Snow is a 69-year-old 
white female with past medical history significant for knee osteoarthritis, 
hypertension, and GERD who presented for elective right knee arthroplasty with 
Dr. Mejia today.  The patient failed outpatient medical conservative treatment 
and elected to proceed with right total knee arthroplasty.  The patient was 
evaluated in the PACU.  She had just recently left the OR.  She awakes easily 
and is overall able to answer short questions.  She tells me she has knee pain.
  She denies chest pain, difficulty breathing, abdominal pain, nausea, or 
vomiting.  She has no questions for me.

 

PAST MEDICAL HISTORY:

1.  Hypertension.

2.  GERD.

3.  Hyperlipidemia.

4.  There is listed cardiomyopathy and mitral valve disorder on her past 
medical record from her primary care provider; however, most recent echo on my 
record is 2009 which has an ejection fraction of 50% and benign heart valves.

5.  Basal cell carcinoma, status post resection.

 

PAST SURGICAL HISTORY:

1.  Bilateral total hip arthroplasty.

2.  Carpal tunnel release.

3.  Bunionectomy.

4.  Tubal ligation.

 

HOME MEDICATIONS:  Include:

1.  Amlodipine 5 mg p.o. daily.

2.  Losartan 50 mg p.o. daily.

3.  Meloxicam 15 mg p.o. daily.

4.  Ranitidine 150 mg p.o. b.i.d.

5.  Magnesium 30 mg p.o. daily.

6.  Vitamin C supplement.

7.  Vitamin D supplement.

8.  Vitamin D supplement.

9.  Probiotic.

10.  Metamucil p.r.n. for constipation.

 

ALLERGIES:  Contact rash to ADHESIVE TAPE.

 

FAMILY HISTORY:  She has a positive family history for coronary artery disease.

 

SOCIAL HISTORY:  The patient lives with her .  She denies smoking, 
alcohol use, and illicit drug use.

 

REVIEW OF SYSTEMS:  An 11-point review of systems was completed and all 
pertinent positives and negatives are as above in the HPI.  All other systems 
are negative.

 

PHYSICAL EXAM:  General:  Older white female, lying in PACU bed, appearing 
comfortable, in no acute distress.  Eyes:  PERRL.  Sclerae anicteric.  ENT:  
Lips dry.  Neck:  Supple.  Lungs:  Clear to auscultation throughout.  Cardio:  
Regular rate and rhythm without murmurs, rubs, or gallops.  Abdomen:  Soft, 
nontender, nondistended.  Extremities:  No clubbing, cyanosis, or edema.  Neuro
:  The patient is initially asleep, but awakens easily to touch, able to follow 
commands and answer questions despite being minimally drowsy.  The patient is 
alert and oriented x3.  Able to move all extremities.  No tremors.

 

DIAGNOSTIC STUDIES/LAB DATA:  None to report.

 

ASSESSMENT AND PLAN:  Cindy Snow is a 69-year-old white female with past 
medical history significant for gastroesophageal reflux disease and 
hypertension who presents for right total knee arthroplasty with Dr. Mejia 
today.  Hospital Medicine has been consulted for co-management of chronic 
disease.

 

1.  Hypertension.  I recommend continuing her home amlodipine and losartan.  I 
am changing her amlodipine to have holding parameters for systolic blood 
pressure less than 110.

2.  Gastroesophageal reflux disease.  Continue ranitidine.

3.  Questionable cardiomyopathy.  While this is on her preoperative clearance 
from her primary care provider, there was no advisement for her to be seen by 
cardiology prior to this procedure and I do not have the more recent 
echocardiogram than 2009. Perhaps, there is a more recent echocardiogram that 
was performed outpatient that I do not have access to.  No management needed at 
this time.

4.  Status post right total knee arthroplasty.  Management per orthopedics.

5.  Disposition.  Per orthopedics.

 

Thank you for allowing us to participate in the care of this patient.  We will 
follow distantly during this admission and can be available for questions at 
any time.

 

____________________________________ 

CHERI ENNIS

 

[id number]/CPS #: [id number]

LAUREN

## 2019-10-24 NOTE — OP
Operative Report - Blank





- Operative Report


Date of Operation: 10/24/19


Note: 





MARIAJOSE ADKINS


1950





Date of Surgery: 10/24/19





Rosalina Mejia MD





Assistant: LELO ALONZO did help throughout the procedure with preparation of 

the knee, wound retraction, manipulation of the knee, and wound closure.  





Anesthesiologist: CONSUELO Lunsford MD





Anesthesia Type: General





Preoperative Diagnosis: Right severe degenerative osteoarthritis of the knee





Postoperative Diagnosis: As above





Procedure Performed: Right Total Knee Arthroplasty





Tourniquet time: 49 minutes





Complications: None





Specimen: Bone and cartilage from the right knee joint sent to pathology.  





Hardware Used: Cemented [employer] total knee hardware was used - For the 

femur a size 5 right legion posterior stabilized femoral component, for the 

tibia a size 5 right stephen II tibial baseplate, for the insert a size 9mm 

constrained posterior stabilized articular polyethylene insert, and for the 

patella a size 32 3-peg all poly patella.  





Brief History/Indication: MARIAJOSE ADKINS was known in clinic and had a 

history of severe right knee pain and swelling. She failed conservative 

treatment with anti-inflammatories, pain pills, intra-articular injections and 

physical therapy.  She elected to undergo right total knee arthroplasty due to 

continued pain and decreased quality of life.  Radiographs showed severe end 

stage osteoarthritis of the knee with bone on bone contact.  Informed consent 

was obtained from the patient.  She understood the risks of surgery included 

but were not limited to: bleeding, infection, damage to nearby structures, 

intraoperative fracture, nerve palsy, failure of the hardware, early loosening, 

knee stiffness or loss of motion, anesthesia complications, stroke, heart attack

, blood clot and death.  She wished to proceed.





Intra-Operative Findings: Intraoperatively the patient was noted to have severe 

loss of cartilage in all 3 compartments of the knee.  She had 15 degree valgus 

deformity to start the case and her MCL had significant laxity.  





Description of the Procedure: 





MARIAJOSE ADKINS was identified in the preanesthesia unit. Her right knee 

was marked as the correct operative side.  Informed consent was signed and 

placed in the chart. The patient was taken to the operating room and placed 

under anesthesia without complication. A kenny catheter was placed. A 

tourniquet was placed on the right thigh. The right lower extremity was prepped 

and draped in the usual sterile fashion. Preoperative time-out was made to 

correctly identify the patient, side and site. Appropriate intraoperative 

antibiotics were given within one hour of incision.





Tourniquet was inflated. A midline incision was made and carried sharply down 

to the extensor mechanism. A new 10 blade was used to make a standard medial 

parapatellar arthrotomy. The patella was subluxed laterally. Electrocautery was 

used to dissect soft tissue off the superomedial tibia to the midsagittal 

plane.  The knee was flexed up. The anterior horn of the lateral meniscus and 

the ACL were sharply incised. A drill was used to enter the distal femur. The 

intramedullary distal femoral cutting guide was pinned on the distal femur. The 

oscillating saw was used to make the distal femoral cut.





The external rotation guide was pinned on the distal femur and the distal femur 

was sized to a size 5. The size 5 multi-cutting jig was pinned on the distal 

femur. The oscillating saw was used to make the appropriate 4 chamfer cuts.  

Next the PCL was completely released.  The extramedullary tibial cutting guide 

was pinned on the proximal tibia and the oscillating saw was used to make the 

proximal tibial cut perpendicular to the mechanical axis of the tibia. The bone 

was carefully removed.





The knee was brought out into full extension. The spacer block was placed and 

had excellent fit with the knee in full extension. The medial and lateral 

ligaments were well balanced. The flexion and extension gaps were well 

balanced. The knee was flexed up. Lamina  was placed both medially and 

laterally. Any remaining meniscus was removed with electrocautery.  Curved 

osteotome was used to remove any posterior osteophytes. The tibial tray and 

drop brenda were placed and confirmed a satisfactory tibial cut.





The size 5 right femoral trial was impacted onto the distal femur. This trial 

had excellent fit and stability. The box for the posterior stabilized implant 

was prepared using a box cut osteotome and a reamer. Next a tibial tray trial 

and 9 mm insert trial was placed. The knee was taken through a range of motion 

and had full extension to 130 degrees of flexion. Patellofemoral tracking was 

satisfactory.





The patella was inverted and sized to a size 32. Three peg holes were drilled 

through the size 32 drill guide. The trial patella was placed and the knee was 

taken through a range of motion. There was satisfactory patellofemoral 

tracking. All trials were removed. The tibia was subluxed anteriorly and sized 

to a size 5. The proximal tibial was prepared with a size 5 keel punch.  All 

bony cut surfaces were irrigated with sterile saline and dried. Final implants 

were cemented into place starting with the tibia, followed by the femur, and 

last the patella. A 9 mm insert trial was placed and the knee was brought into 

full extension.





Tourniquet was turned down and the knee was copiously irrigated with sterile 

saline. Electrocautery was used to obtain meticulous hemostasis. Once the 

cement had fully cured, the insert trial was removed. Any excess cement was 

removed from around the hardware and capsule.  Final insert chosen was a 9 mm 

posterior stabilized constrained Stephen II articular insert size 5-6. 

Stability of the insert was checked and noted to be stable.





The extensor mechanism was closed using number 1 vicryls. The rest of the 

incision was closed in a layered fashion using 0 and 2-0 vicryls. The skin was 

closed using 3-0 nylon suture. Sterile xeroform, 4x4s and webril were used to 

cover the incision.  Ace wrap and cold pack were used to cover the dressings. 

The patients anesthesia was reversed without difficulty. She was taken to the 

PACU in stable condition.  Intended weight-bearing will be as tolerated.

## 2019-10-24 NOTE — XMS REPORT
Continuity of Care Document (CCD)

 Created on:2019



Patient:Cindy Snow

Sex:Female

:1950

External Reference #:[id number]





Demographics







 Address  [address] [place] [address]

 

 Home Phone  [phone]

 

 Mobile Phone  [phone]

 

 Preferred Language  en

 

 Marital Status  Declined to Specify/Unknown

 

 Church Affiliation  Unknown

 

 Race  White

 

 Ethnic Group  Declined to Specify/Unknown









Author







 Name  Kasia Baez PA (transmitted by agent of provider Carroll Cosme)

 

 Address  [address], [address]



   [place] [address]









Care Team Providers







 Name  Role  Phone

 

 Cyndee Theodore MD, PHD - Family  Care Team Information   [phone]
[phone]



 Medicine    









Problems







 Active Problems  Provider  Date

 

 Localized, primary osteoarthritis of the  BacilioOlegario MD  Onset: 2012



 pelvic region and thigh    

 

 Prosthetic arthroplasty of the hip  Jackie Bernard MD  Onset: 2012

 

 Chest pain  Ayala Fischer ANP  Onset: 10/14/2015

 

 Mixed hyperlipidemia  Ayala Fischer ANP  Onset: 10/14/2015

 

 Essential hypertension  Ayala Fischer ANP  Onset: 10/14/2015

 

 Contact dermatitis  Cyndee Theodore MD, PHD  Onset: 2019

 

 Actinic keratosis  Cyndee Theodore MD, PHD  Onset: 2019

 

 Insomnia  Cyndee Theodore MD, PHD  Onset: 2019

 

 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

 

 Localized, primary osteoarthritis  Cyndee Theodore MD, PHD  Onset: 2019

 

 Diaphragmatic hernia  Cyndee Theodore MD, PHD  Onset: 2019

 

 Pain in limb  Cyndee Theodore MD, PHD  Onset: 04/15/2019

 

 Gynecologic examination  Cyndee Theodore MD, PHD  Onset: 04/15/2019







Social History







 Type  Date  Description  Comments

 

 Birth Sex    Unknown  

 

 Tobacco Use  Start: Unknown  Never Smoked Cigarettes  

 

 ETOH Use    Rarely consumes alcohol  

 

 Tobacco Use  Start: Unknown  Patient denies history of smoking  

 

 Smoking Status  Reviewed: 04/15/19  Patient denies history of smoking  







Allergies, Adverse Reactions, Alerts







 Active Allergies  Reaction  Severity  Comments  Date

 

 NKDA        2018

 

 Environmental        2013









 Inactive Allergies









 NKDA        2012

 

 Adhesives      Sores  10/03/2012







Medications







 Active Medications  SIG  Qnty  Indications  Ordering  Date



         Provider  

 

 D3 Maximum Strength  1 cap by mouth  90caps  M17.0  Cyndee Theodore,  2019



   every day      MD, PHD  



 5000Unit Capsules          



           

 

 Arnicare  apply to affected  75g  M17.0  Cyndee Theodore,  2019



           Gel  area four times a      MD, PHD  



   day pain, swelling,        



   bruising        

 

 Ranitidine HCL  take one tablet by  180tabs  K44.9  Cyndee Theodore,  2019



   mouth twice a day      MD, PHD  



 150mg Tablets  as needed        



           

 

 Nystatin  apply to affected  90gm  L30.9  Cyndee Theodore,  2019



   area twice a day      MD, PHD  



 206597Cyhz/GM Cream  until rash has        



   disappeared for a        



   few days        

 

 Losartan Potassium  1 by mouth every  90tabs  I10  Vivien Singh  2018



   day      DELIA Sanders,  



 50mg Tablets        FNP  



           

 

 Amlodipine Besylate  1 by mouth every  30tabs  I10  Vivien Singh  2018



   day      DELIA Sanders,  



 5mg Tablets        FNP  



           

 

 Herbal Supplements        Vivien Singh  03/08/2018



 X 8        DELIA Sanders,  



         FNP  

 

 Meloxicam  1 by mouth every      Unknown  



           15mg  day        



 Tablets          



           

 

 Magnesium  take 1 tab by mouth      Unknown  



           300mg  every daily        



 Capsules          



           

 

 Aspirin Adult  1 tab by mouth      Unknown  



               325mg  every day        



 Tablets          



           









 History Medications









 Gabapentin  1-2 tab by mouth  60caps  N95.1  Cyndee Theodore,  04/15/2019 -



           300mg  at bedtime      MD, PHD  Unknown



 Capsules          



           







Immunizations







 CPT Code  Status  Date  Vaccine  Lot #

 

 [id number]  Given  2019  Tdap injection  







Vital Signs







 Date  Vital  Result  Comment

 

 10/04/2019  7:49am  BP Systolic Sitting Left Arm  126 mmHg  









 BP Diastolic Sitting Left Arm  72 mmHg  

 

 Heart Rate  71 /min  

 

 Respiratory Rate  18 /min  

 

 Height  67 inches  5'7"

 

 Weight  248.00 lb  

 

 BMI (Body Mass Index)  38.8 kg/m2  

 

 BSA (Body Surface Area)  2.22 m2  

 

 Ideal body weight in kilograms  61 kg  

 

 O2 % BldC Oximetry  97 %  









 2019 10:41am  BP Systolic Sitting Left Arm  138 mmHg  









 BP Diastolic Sitting Left Arm  90 mmHg  

 

 Heart Rate  76 /min  

 

 Respiratory Rate  18 /min  

 

 Height  67 inches  5'7"

 

 Weight  249.00 lb  

 

 BMI (Body Mass Index)  39.0 kg/m2  

 

 BSA (Body Surface Area)  2.22 m2  

 

 Ideal body weight in kilograms  61 kg  

 

 O2 Saturation Level with Exercise  93 %  







Results







 Test  Date  Facility  Test  Result  H/L  Range  Note

 

 Genital Culture  04/15/2019  [facility]  Gram Stain  GRAM STAIN      1, 2



 W/ Gram Stain    [address]    INDIC <SEE      



     [place] [address]    NOTE>      



     [phone]          









 Gram Stain  FEW GR POS. BACI <SEE NOTE>      3

 

 Gram Stain  RARE GRAM POSITI <SEE NOTE>      4

 

 Gram Stain  FEW WHITE BLOOD  <SEE NOTE>      5

 

 Genital Culture  GENITAL TELMA      









 Laboratory test  04/15/2019  [facility]  Uric Acid  4.2 mg/dL  Normal  2.6-6.0  



 finding    [address]          



     [place] [address]          



     [phone]          









 Anti-Nuclear Antibodies Direct  Negative AU/mL    Negative  6

 

 Rheumatoid Factor Screen  < 10.0 IU/mL  Normal  0.0-15.0  









 CCP Igg/Iga  04/15/2019  [facility]  CCP Igg/Iga  5 units    0-19  7



 Antibodies    [address]  Antibodies        



     [place] [address]          



     [phone]          

 

 HPV High Risk  04/15/2019  [facility]  HPV, high-risk  NEGATIVE    Negative  



     [address] [place] [address]          



     [phone]          









 HPV Source:  THIN PREP      









 1  Z01.419,M79.674

 

 2  GRAM STAIN INDICATES NORMAL GENITAL TELMA

 

 3  FEW GR POS. BACILLI SUGGESTIVE OF LACTOBACILLUS SP.

 

 4  RARE GRAM POSITIVE COCCI

 

 5  FEW WHITE BLOOD CELLS

 

 6  Performed at:   - [facility]  [id number]



   : Gilbert Diaz MD, Phone:  [phone]



   Performed at:   - [facility] [address]  [id number]



   : Araceli B Reyes MD, Phone:  [phone]

 

 7  Negative               <20



   Weak positive      20 - 39



   Moderate positive  40 - 59



   Strong positive        >59







Procedures







 Date  Code  Description  Status

 

 10/04/2019  46891  EKG-Tracing And Report  Completed







[employer]







 Description

 

 No Information Available







Encounters







 Type  Date  Location  Provider  Dx  Diagnosis

 

 Office Visit  10/04/2019  Cardiology Office  Kasia Baez  Z01.810  
Encounter for



   8:20a    CHERI GUZMAN    preprocedural



           cardiovascular



           examination









 I42.9  Cardiomyopathy, unspecified

 

 I34.0  Nonrheumatic mitral (valve) insufficiency

 

 I10  Essential (primary) hypertension

 

 E78.2  Mixed hyperlipidemia









 Office Visit  2019 10:40a  Cardiology  Vivien Singh  R07.9  Chest pain,



     Office  DELIA Sanders,    unspecified



       FNP    









 I42.9  Cardiomyopathy, unspecified

 

 I34.0  Nonrheumatic mitral (valve) insufficiency

 

 I10  Essential (primary) hypertension

 

 E78.2  Mixed hyperlipidemia









 Office Visit  04/15/2019 11:30a  Family Medicine  Arben,  Z01.419  Encntr for 
gyn



     [facility]  MD Cyndee,    exam (general)



       PHD    (routine) w/o



           abn findings









 Z01.419  Encntr for gyn exam (general) (routine) w/o abn findings

 

 N95.1  Menopausal and female climacteric states

 

 N95.1  Menopausal and female climacteric states

 

 M79.674  Pain in right toe(s)

 

 I10  Essential (primary) hypertension

 

 M79.674  Pain in right toe(s)







Assessments







 Date  Code  Description  Provider

 

 10/04/2019  Z01.810  Encounter for preprocedural  Kasia Baez, PA



     cardiovascular examination  

 

 10/04/2019  I42.9  Cardiomyopathy, unspecified  Kasia Baez, PA

 

 10/04/2019  I34.0  Nonrheumatic mitral (valve)  Kasia Baez, PA



     insufficiency  

 

 10/04/2019  I10  Essential (primary) hypertension  Kasia Baez, PA

 

 10/04/2019  E78.2  Mixed hyperlipidemia  Kasia Baez, PA

 

 2019  R07.9  Chest pain, unspecified  Vivien Singh, MSN,



       FNP

 

 2019  I42.9  Cardiomyopathy, unspecified  Vivien Singh, MSN,



       FNP

 

 2019  I34.0  Nonrheumatic mitral (valve)  Vivien Singh, MSN,



     insufficiency  FNP

 

 2019  I10  Essential (primary) hypertension  Vivien Singh, MSN,



       FNP

 

 2019  E78.2  Mixed hyperlipidemia  Vivien Singh, MSN,



       FNP

 

 04/15/2019  Z01.419  Encounter for gynecological  Cyndee Theodore MD, PHD



     examination (general) (routine)  



     without abnormal findings  

 

 04/15/2019  Z01.419  Encounter for gynecological  Cyndee Theodore MD, PHD



     examination (general) (routine)  

 

 04/15/2019  N95.1  Menopausal and female climacteric  Cyndee Theodore MD, PHD



     states  

 

 04/15/2019  N95.1  Menopausal and female climacteric  Cyndee Theodore MD, PHD



     states  

 

 04/15/2019  M79.674  Pain in right toe(s)  Cyndee Theodore MD, PHD

 

 04/15/2019  I10  Essential (primary) hypertension  Cyndee Theodore MD, PHD

 

 04/15/2019  M79.674  Pain in right toe(s)  Cyndee Theodore MD, PHD







Plan of Treatment

Future Appointment(s):10/15/2019  2:15 pm - Cyndee Theodore MD, PHD at [facility]2020 11:00 am - Vivien Singh, MSN, FNP at 
Cardiology [facility]02/2019 - Kasia Baez, PAZ01.810 Encounter for 
preprocedural cardiovascular examinationComments:As above.I42.9 Cardiomyopathy, 
unspecifiedComments:Monitor.   No changes.I34.0 Nonrheumatic mitral (valve) 
insufficiencyNew Orders:Echocardiogram, Ordered: 10/04/19Comments:We will 
repeat the echo in 1 year.I10 Essential (primary) hypertensionComments:Monitor.
   BP goal is &lt;140/90 mmHg.E78.2 Mixed hyperlipidemiaNew Labs:Liver Function 
Tests, Ordered: 10/04/19LDL Cholesterol Profile, Ordered: 10/04/19Comments:Due 
for a lipid monitor.AllFollow up:1 year with echo prior



Functional Status







 Functional Condition  Comment  Date  Status

 

 Independent with all ADL's      Active







Mental Status







 Description

 

 No Information Available







Referrals







 Description

 

 No Information Available

## 2019-10-25 VITALS — DIASTOLIC BLOOD PRESSURE: 60 MMHG | SYSTOLIC BLOOD PRESSURE: 110 MMHG

## 2019-10-25 LAB
ANION GAP SERPL CALC-SCNC: 8 MMOL/L (ref 2–11)
BUN SERPL-MCNC: 10 MG/DL (ref 6–24)
BUN/CREAT SERPL: 18.9 (ref 8–20)
CALCIUM SERPL-MCNC: 9.1 MG/DL (ref 8.6–10.3)
CHLORIDE SERPL-SCNC: 103 MMOL/L (ref 101–111)
GLUCOSE SERPL-MCNC: 119 MG/DL (ref 70–100)
HCO3 SERPL-SCNC: 27 MMOL/L (ref 22–32)
HCT VFR BLD AUTO: 37 % (ref 35–47)
HGB BLD-MCNC: 12.9 G/DL (ref 12–16)
PLATELET # BLD AUTO: 307 10^3/UL (ref 150–450)
POTASSIUM SERPL-SCNC: 4.2 MMOL/L (ref 3.5–5)
SODIUM SERPL-SCNC: 138 MMOL/L (ref 135–145)

## 2019-10-25 RX ADMIN — SODIUM CHLORIDE, SODIUM LACTATE, POTASSIUM CHLORIDE, AND CALCIUM CHLORIDE SCH MLS/HR: 600; 310; 30; 20 INJECTION, SOLUTION INTRAVENOUS at 05:49

## 2019-10-25 RX ADMIN — OXYCODONE HYDROCHLORIDE PRN MG: 5 CAPSULE ORAL at 08:52

## 2019-10-25 RX ADMIN — ACETAMINOPHEN SCH: 325 TABLET ORAL at 05:50

## 2019-10-25 RX ADMIN — CEFAZOLIN SCH MLS/HR: 1 INJECTION, POWDER, FOR SOLUTION INTRAVENOUS at 07:53

## 2019-10-25 RX ADMIN — OXYCODONE HYDROCHLORIDE AND ACETAMINOPHEN PRN TAB: 5; 325 TABLET ORAL at 05:50

## 2019-10-25 RX ADMIN — ACETAMINOPHEN SCH: 325 TABLET ORAL at 12:53

## 2019-10-25 RX ADMIN — OXYCODONE HYDROCHLORIDE PRN MG: 5 CAPSULE ORAL at 14:27

## 2019-10-25 RX ADMIN — CEFAZOLIN SCH MLS/HR: 1 INJECTION, POWDER, FOR SOLUTION INTRAVENOUS at 15:51

## 2019-10-25 RX ADMIN — OXYCODONE HYDROCHLORIDE AND ACETAMINOPHEN PRN TAB: 5; 325 TABLET ORAL at 12:46

## 2019-10-25 RX ADMIN — FAMOTIDINE SCH MG: 20 TABLET, FILM COATED ORAL at 08:52

## 2019-10-25 RX ADMIN — DOCUSATE SODIUM SCH MG: 100 CAPSULE, LIQUID FILLED ORAL at 08:52

## 2019-10-25 RX ADMIN — OXYCODONE HYDROCHLORIDE AND ACETAMINOPHEN PRN TAB: 5; 325 TABLET ORAL at 16:59

## 2019-10-25 RX ADMIN — MAGNESIUM HYDROXIDE SCH ML: 400 SUSPENSION ORAL at 10:25

## 2019-10-25 NOTE — DS
Orthopedic Discharge Summary





- Discharge Summary





Date of Admission:10/24/19


Date of Discharge: 10/25/2019


Date of Surgery: 10/24/2019


Attending Orthopedic Provider: Dr. Mejia


Pre-operative Diagnosis: Right knee osteoarthritis


Operative Procedure: Right total knee arthroplasty 


Disposition of Patient: Home


Condition of Patient: Good 


History: MARIAJOSE ADKINS is a 69 year old F with years of increasingly 

severe right knee pain. Patient has failed conservative management and has 

elected to undergo a right total knee replacement


Hospital Course: MARIAJOSE was admitted to [facility] on 10/24/19. 

Patient underwent a right total knee replacement without complication followed 

by a brief recovery in PACU and transfer to the Short Stay Surgical Unit in 

stable condition. Our hospitalist service, physical therapy and occupational 

therapy also participated in this patients care. Post-op day 1: patient was 

alert and in no acute distress. Dressing was clean, dry and intact. Operative 

extremity dorsiflexion and plantarflexion intact, sensation intact to light 

touch distally, DP2+. Dressing was changed, incision was clean, dry and intact. 

Patient was deemed to be medically and orthopedically stable for discharge. 

Physical therapy goals were met.





 Home Medications











 Medication  Instructions  Recorded  Confirmed  Type


 


Meloxicam [Mobic] 1 tab PO QPM 10/22/18 10/24/19 History


 


Amlodipine Besylate [Norvasc] 5 mg PO QAM 10/14/19 10/24/19 History


 


Ascorbic Acid [Vitamin C] 1,000 mg PO QAM 10/14/19 10/24/19 History


 


Cardio Kit 1 tab PO QAM 10/14/19 10/24/19 History


 


Cholecalciferol TAB* [Vitamin D 5,000 unit PO QAM 10/14/19 10/24/19 History





TAB*]    


 


Food Enzyme 1 tab PO QAM 10/14/19 10/24/19 History


 


Lactobacillus Combination No.8 1 tab PO QAM 10/14/19 10/24/19 History





[Adult Probiotic]    


 


Losartan Potassium [Cozaar] 50 mg PO QAM 10/14/19 10/24/19 History


 


Turmeric 500 mg PO QAM 10/14/19 10/24/19 History


 


Vitamin E CAP* 360 unit PO QAM 10/14/19 10/24/19 History


 


raNITIdine HCl [Ranitidine HCl] 150 mg PO BID 10/14/19 10/24/19 History











Discharge Instructions following Orthopedic Surgery:





Activity:  


* Weight Bearing as tolerated


* Continue physical therapy and occupational therapy exercises as shown





Wound care: 


* OK to shower on post-op day 3, no bathing, swimming, or submerging wound. 


* Use gentle soap, pat dry. Cover with gauze, ACE wrap or tape.


* Visiting home nurse to do wound checks.





Call Orthopedic office for: 


* Increased drainage


* Redness


* Increased pain


* Fever 





***Go to ER with shortness of breath or chest pain.***





Diet: 


* Regular diet


* Increase fluids and fiber to prevent constipation. 


* Continue to use stool softeners, call office if no bowel motion within 48 

hours.








Medications


See Home Medication List in your packet for medications that you should take 

after discharge.





DVT Prophylaxis:








   Eliquis Dosin.5 mg, 1 tab every 12 hours x 30 days





 


Pain Control:





   Percocet Dosin/325 mg 1-2 tabs by mouth every 4-6 hours as needed for 

pain. Maximum of 10 tabs per day.








**Please note that Percocet contains Tylenol (acetaminophen). Maximum daily 

dose of Tylenol is 4000 mg from all sources.**





Antibiotics are required prior to any dental work.








FOLLOW UP: Follow up with  [Roberto] Within 10-14 days, call for appointment





Please call our office with any questions or concerns ([phone])

## 2019-10-31 ENCOUNTER — HOSPITAL ENCOUNTER (EMERGENCY)
Dept: HOSPITAL 25 - ED | Age: 69
Discharge: HOME | End: 2019-10-31
Payer: MEDICARE

## 2019-10-31 VITALS — SYSTOLIC BLOOD PRESSURE: 143 MMHG | DIASTOLIC BLOOD PRESSURE: 71 MMHG

## 2019-10-31 DIAGNOSIS — Z79.899: ICD-10-CM

## 2019-10-31 DIAGNOSIS — Z96.643: ICD-10-CM

## 2019-10-31 DIAGNOSIS — I10: ICD-10-CM

## 2019-10-31 DIAGNOSIS — Z79.01: ICD-10-CM

## 2019-10-31 DIAGNOSIS — Z96.651: ICD-10-CM

## 2019-10-31 DIAGNOSIS — K21.9: ICD-10-CM

## 2019-10-31 DIAGNOSIS — I82.401: Primary | ICD-10-CM

## 2019-10-31 LAB
ALBUMIN SERPL BCG-MCNC: 3.7 G/DL (ref 3.2–5.2)
ALBUMIN/GLOB SERPL: 1 {RATIO} (ref 1–3)
ALP SERPL-CCNC: 117 U/L (ref 34–104)
ALT SERPL W P-5'-P-CCNC: 56 U/L (ref 7–52)
ANION GAP SERPL CALC-SCNC: 7 MMOL/L (ref 2–11)
APTT PPP: 34.1 SECONDS (ref 26–38)
AST SERPL-CCNC: 39 U/L (ref 13–39)
BASOPHILS # BLD AUTO: 0.1 10^3/UL (ref 0–0.2)
BUN SERPL-MCNC: 19 MG/DL (ref 6–24)
BUN/CREAT SERPL: 29.2 (ref 8–20)
CALCIUM SERPL-MCNC: 9.5 MG/DL (ref 8.6–10.3)
CHLORIDE SERPL-SCNC: 101 MMOL/L (ref 101–111)
EOSINOPHIL # BLD AUTO: 0.2 10^3/UL (ref 0–0.6)
GLOBULIN SER CALC-MCNC: 3.7 G/DL (ref 2–4)
GLUCOSE SERPL-MCNC: 101 MG/DL (ref 70–100)
HCO3 SERPL-SCNC: 29 MMOL/L (ref 22–32)
HCT VFR BLD AUTO: 35 % (ref 35–47)
HGB BLD-MCNC: 11.8 G/DL (ref 12–16)
INR PPP/BLD: 1.03 (ref 0.82–1.09)
LYMPHOCYTES # BLD AUTO: 1.7 10^3/UL (ref 1–4.8)
MCH RBC QN AUTO: 30 PG (ref 27–31)
MCHC RBC AUTO-ENTMCNC: 34 G/DL (ref 31–36)
MCV RBC AUTO: 88 FL (ref 80–97)
MONOCYTES # BLD AUTO: 0.8 10^3/UL (ref 0–0.8)
NEUTROPHILS # BLD AUTO: 4.4 10^3/UL (ref 1.5–7.7)
NRBC # BLD AUTO: 0 10^3/UL
NRBC BLD QL AUTO: 0.1
PLATELET # BLD AUTO: 392 10^3/UL (ref 150–450)
POTASSIUM SERPL-SCNC: 4.2 MMOL/L (ref 3.5–5)
PROT SERPL-MCNC: 7.4 G/DL (ref 6.4–8.9)
RBC # BLD AUTO: 3.91 10^6 /UL (ref 3.7–4.87)
SODIUM SERPL-SCNC: 137 MMOL/L (ref 135–145)
WBC # BLD AUTO: 7.3 10^3/UL (ref 3.5–10.8)

## 2019-10-31 PROCEDURE — 85610 PROTHROMBIN TIME: CPT

## 2019-10-31 PROCEDURE — 99282 EMERGENCY DEPT VISIT SF MDM: CPT

## 2019-10-31 PROCEDURE — 85730 THROMBOPLASTIN TIME PARTIAL: CPT

## 2019-10-31 PROCEDURE — 80053 COMPREHEN METABOLIC PANEL: CPT

## 2019-10-31 PROCEDURE — 85025 COMPLETE CBC W/AUTO DIFF WBC: CPT

## 2019-10-31 PROCEDURE — 36415 COLL VENOUS BLD VENIPUNCTURE: CPT

## 2019-10-31 NOTE — ED
Lower Extremity





- HPI Summary


HPI Summary: 


69 year [place] [facility] [place]  complains of worsening 

right calf pain, sorneness, and tightness since today 10/31/19 AM. The patient 

rates the pain 10/10 in severity. Symptoms aggravated by nothing. Symptoms 

alleviated by nothing. States she recently had total right knee replacement 

done by Dr. Mejia on 10/14/19. States she was prescribed oxycodone after the 

surgery. States she had an US this morning that was positive for DVT. States 

she was referred to ED. States she is on anticoagulants.





- History of Current Complaint


Chief Complaint: EDExtremityLower


Stated Complaint: BLOOD CLOT RT LEG PER PT


Time Seen by Provider: 10/31/19 19:25


Hx Obtained From: Patient


Onset of Pain: Hours


Onset/Duration: Hours


Severity Currently: Severe


Pain Intensity: 10


Pain Scale Used: 0-10 Numeric


Timing: Constant


Location: Is Discrete @ - right calf


Aggravating Factor(s): Nothing


Alleviating Factor(s): Nothing





- Allergies/Home Medications


Allergies/Adverse Reactions: 


 Allergies











Allergy/AdvReac Type Severity Reaction Status Date / Time


 


Adhesive Tape Allergy  Rash Verified 10/24/19 13:09














PMH/Surg Hx/FS Hx/Imm Hx


Endocrine/Hematology History: 


   Denies: Hx Anticoagulant Therapy, Hx Diabetes, Hx Thyroid Disease


Cardiovascular History: Reports: Hx Angina, Hx Hypertension, Other 

Cardiovascular Problems/Disorders - murmer


   Denies: Hx Congestive Heart Failure, Hx Hypercholesterolemia, Hx Pacemaker/

ICD


Respiratory History: 


   Denies: Hx Asthma, Hx Chronic Obstructive Pulmonary Disease (COPD)


GI History: Reports: Hx Gastroesophageal Reflux Disease, Hx Hiatal Hernia


   Denies: Other GI Disorders


 History: 


   Denies: Hx Dialysis, Hx Renal Disease


Musculoskeletal History: Reports: Hx Arthritis


   Denies: Other Musculoskeletal History


Sensory History: Reports: Hx Cataracts - cataract removed from Rt, cataract 

present in Left, Hx Contacts or Glasses - reading glasses


   Denies: Hx Hearing Aid


Opthamlomology History: Reports: Hx Cataracts - cataract removed from Rt, 

cataract present in Left, Hx Contacts or Glasses - reading glasses


Neurological History: Reports: Hx Migraine - No longer have migraines


   Denies: Hx Dementia, Hx Seizures


Psychiatric History: 


   Denies: Hx Substance Abuse





- Surgical History


Surgery Procedure, Year, and Place: BILATERAL HIP REPLACEMENT/BUNION REMOVAL/

TUBIAL LIGATION.  total right knee 10/14/19


Hx Anesthesia Reactions: No


Infectious Disease History: No


Infectious Disease History: 


   Denies: Hx Hepatitis, Hx Human Immunodeficiency Virus (HIV), History Other 

Infectious Disease, Traveled Outside the [place] in Last 30 Days





- Family History


Known Family History: Positive: Cardiac Disease - CAD





- Social History


Alcohol Use: Rare


Alcohol Amount: social


Hx Substance Use: No


Substance Use Type: Reports: None


Hx Tobacco Use: No


Smoking Status (MU): Never Smoked Tobacco





Review of Systems


Negative: Fever


Positive: Other - right calf pain, sorneness, and tightness


All Other Systems Reviewed And Are Negative: Yes





Physical Exam





- Summary


Physical Exam Summary: 





Appearance: Well-appearing, Well-nourished, lying in bed comfortable


Skin: Warm, dry, no obvious rash


Eyes: sclera anicteric, no conjunctival pallor


ENT: mucous membranes moist


Neck: deferred


Respiratory: No signs of respiratory distress


Cardiovascular: Appears well perfused, pulses are nml


Abdomen: deferred


Musculoskeletal: She has a wrap around right knee. Her surgical wound was not 

inspected. There is swelling in her right lower leg with slight degree of 

plethora but no definite erythema. Her right leg is diffusely tender.


Neurological: Awake and alert, mentation is normal, speech is fluent and 

appropriate


Psychiatric: affect is normal, does not appear anxious or depressed


Triage Information Reviewed: Yes


Vital Signs On Initial Exam: 


 Initial Vitals











Temp Pulse Resp BP Pulse Ox


 


 98.6 F   72   18   142/86   97 


 


 10/31/19 17:34  10/31/19 17:34  10/31/19 17:34  10/31/19 17:34  10/31/19 17:34











Vital Signs Reviewed: Yes





Procedures





- Sedation


Patient Received Moderate/Deep Sedation with Procedure: No





Diagnostics





- Vital Signs


 Vital Signs











  Temp Pulse Resp BP Pulse Ox


 


 10/31/19 17:34  98.6 F  72  18  142/86  97














- Laboratory


Lab Results: 


 Lab Results











  10/31/19 10/31/19 10/31/19 Range/Units





  18:29 18:29 18:29 


 


WBC  7.3    (3.5-10.8)  10^3/uL


 


RBC  3.91    (3.70-4.87)  10^6 /uL


 


Hgb  11.8 L    (12.0-16.0)  g/dL


 


Hct  35    (35-47)  %


 


MCV  88    (80-97)  fL


 


MCH  30    (27-31)  pg


 


MCHC  34    (31-36)  g/dL


 


RDW  14    (10-15)  %


 


Plt Count  392    (150-450)  10^3/uL


 


MPV  7.5    (7.4-10.4)  fL


 


Neut % (Auto)  60.7    %


 


Lymph % (Auto)  24.0    %


 


Mono % (Auto)  11.3    %


 


Eos % (Auto)  3.1    %


 


Baso % (Auto)  0.9    %


 


Absolute Neuts (auto)  4.4    (1.5-7.7)  10^3/ul


 


Absolute Lymphs (auto)  1.7    (1.0-4.8)  10^3/ul


 


Absolute Monos (auto)  0.8    (0-0.8)  10^3/ul


 


Absolute Eos (auto)  0.2    (0-0.6)  10^3/ul


 


Absolute Basos (auto)  0.1    (0-0.2)  10^3/ul


 


Absolute Nucleated RBC  0.0    10^3/ul


 


Nucleated RBC %  0.1    


 


INR (Anticoag Therapy)   1.03   (0.82-1.09)  


 


APTT   34.1   (26.0-38.0)  seconds


 


Sodium    137  (135-145)  mmol/L


 


Potassium    4.2  (3.5-5.0)  mmol/L


 


Chloride    101  (101-111)  mmol/L


 


Carbon Dioxide    29  (22-32)  mmol/L


 


Anion Gap    7  (2-11)  mmol/L


 


BUN    19  (6-24)  mg/dL


 


Creatinine    0.65  (0.51-0.95)  mg/dL


 


Est GFR ( Amer)    109.0  (>60)  


 


Est GFR (Non-Af Amer)    90.1  (>60)  


 


BUN/Creatinine Ratio    29.2 H  (8-20)  


 


Glucose    101 H  ()  mg/dL


 


Calcium    9.5  (8.6-10.3)  mg/dL


 


Total Bilirubin    0.60  (0.2-1.0)  mg/dL


 


AST    39  (13-39)  U/L


 


ALT    56 H  (7-52)  U/L


 


Alkaline Phosphatase    117 H  ()  U/L


 


Total Protein    7.4  (6.4-8.9)  g/dL


 


Albumin    3.7  (3.2-5.2)  g/dL


 


Globulin    3.7  (2-4)  g/dL


 


Albumin/Globulin Ratio    1.0  (1-3)  











Result Diagrams: 


 10/31/19 18:29





 10/31/19 18:29


Lab Statement: Any lab studies that have been ordered have been reviewed, and 

results considered in the medical decision making process.





Re-Evaluation





- Re-Evaluation


  ** First Eval


Re-Evaluation Time: 19:40


Change: Improved


Comment: agreeable to d/c.  understands d/c plans





Lower Extremity Course/Dx





- Course


Course Of Treatment: 69 year old F s/p total right knee replacement on 10/14/19 

complains of worsening right calf pain, sorneness, and tightness since today 10/

31/19 AM. States she had an US this morning with dx DVT. States she has been 

taking oxycodone x1 after the surgery usually with relief, but today, oxycodone 

x1 with no relief. States she is on anticoagulants.  Bloodwork results with no 

significant abnormalities except for Hgb 11.8, BUN/creatinine 29.2, glucose 101

, ALT 56, and alkaline phosphatase 117.  Spoke with Dr. Yap, orthopedics, 

who agrees with increasing Eliquis dose. In the ED course, patient was given 

oxycodone x2 and Eliquis 10 mg. The patient feels better after medications. 

Patient will be discharged home with prescription for with prescription for 

Eliquis 10 mg PO BID and follow up from her primary care provider. Patient was 

instructed to return to Emergency Department for new or worsening symptoms. 

Patient understands and is agreeable to this plan.





- Diagnoses


Provider Diagnoses: 


 Deep vein thrombosis (DVT)








- Physician Notifications


Discussed Care Of Patient With: Francois Yap


Time Discussed With Above Provider: 19:32


Instructed by Provider To: Other - Dr. Yap, orthopedics, agrees with 

increasing Eliquis dose.





Discharge ED





- Sign-Out/Discharge


Documenting (check all that apply): Patient Departure - Discharge





- Discharge Plan


Condition: Good


Disposition: HOME


Prescriptions: 


Apixaban* [Eliquis*] 10 mg PO BID #60 tab


Patient Education Materials:  Deep Vein Thrombosis (ED)


Referrals: 


Cyndee Theodore MD [Primary Care Provider] - 





- Billing Disposition and Condition


Condition: GOOD


Disposition: Home





- Attestation Statements


Document Initiated by Scribe: Yes


Documenting Scribe: Lucia Whitehead


Provider For Whom Scribe is Documenting (Include Credential): Roddy Mac MD


Scribe Attestation: 


I, Lucia Whitehead, scribed for Roddy Mac MD on 11/01/19 at 0201. 


Scribe Documentation Reviewed: Yes


Provider Attestation: 


The documentation as recorded by the scribe, Lucia Whitehead accurately reflects 

the service I personally performed and the decisions made by me, Roddy Mac MD


Status of Scribe Document: Viewed